# Patient Record
Sex: FEMALE | Race: WHITE
[De-identification: names, ages, dates, MRNs, and addresses within clinical notes are randomized per-mention and may not be internally consistent; named-entity substitution may affect disease eponyms.]

---

## 2019-07-23 ENCOUNTER — HOSPITAL ENCOUNTER (EMERGENCY)
Dept: HOSPITAL 60 - LB.ED | Age: 79
Discharge: HOME | End: 2019-07-23
Payer: MEDICARE

## 2019-07-23 DIAGNOSIS — R04.0: Primary | ICD-10-CM

## 2019-07-23 DIAGNOSIS — Z86.711: ICD-10-CM

## 2019-07-23 NOTE — EDM.PDOC
ED HPI GENERAL MEDICAL PROBLEM





- General


Chief Complaint: General


Stated Complaint: BLOODY NOSE


Time Seen by Provider: 07/23/19 02:50


Source of Information: Reports: Patient, Family, RN


History Limitations: Reports: No Limitations





- History of Present Illness


INITIAL COMMENTS - FREE TEXT/NARRATIVE: 


79 yr female presents to ER with nasal bleeding.  States she is taking a DOAC 

for hx of PE.  She has been using kleenex for packing to nares.  States she hasn

't had any nasal bleeding until now.  She is from [place] and is staying in 

[place] for the summer months.   is with her.  Pt has ate today and is 

taking fluids well.  She is sitting on the side of the stretcher in no acute 

distress.








ED ROS GENERAL





- Review of Systems


Review Of Systems: See Below


Constitutional: Reports: No Symptoms


HEENT: Reports: Nosebleed.  Denies: Sinus Problem, Throat Pain


Respiratory: Reports: No Symptoms


Cardiovascular: Reports: No Symptoms


GI/Abdominal: Reports: No Symptoms


Skin: Reports: No Symptoms


Neurological: Reports: No Symptoms


Psychiatric: Reports: No Symptoms


Hematologic/Lymphatic: Reports: Easy Bleeding





ED EXAM, GENERAL





- Physical Exam


Exam: See Below


Exam Limited By: No Limitations


General Appearance: Alert, No Apparent Distress


Ears: Hearing Grossly Normal


Nose: Other (epistaxis has stopped, no bleeding from nares upon exam.)


Throat/Mouth: Normal Inspection, Normal Lips, Normal Voice, No Airway Compromise


Head: Atraumatic, Normocephalic


Neck: Supple, Non-Tender, Full Range of Motion


Respiratory/Chest: No Respiratory Distress


Cardiovascular: Regular Rate, Rhythm


Extremities: Other (using walker for ambulation)


Neurological: Alert, Oriented, Normal Cognition


Psychiatric: Normal Affect, Normal Mood


Skin Exam: Warm, Dry, Normal Color





Departure





- Departure


Time of Disposition: 03:02


Disposition: Home, Self-Care 01


Condition: Fair


Clinical Impression: 


 Epistaxis








- Discharge Information


*PRESCRIPTION DRUG MONITORING PROGRAM REVIEWED*: Not Applicable


*COPY OF PRESCRIPTION DRUG MONITORING REPORT IN PATIENT [facility]: Not Applicable


Instructions:  Nosebleed, Easy-to-Read


Referrals: 


PCP,None [Primary Care Provider] - 


Forms:  ED Department Discharge





- Assessment/Plan


Plan: 


 Epistaxis has stopped upon exam of pt.  Recommend to have no sneezing, no 

blowing of nose for next 24 hour as able.  Recommend use of saline mist to 

nares or vaseline, thin amount for moisture and no use of fingernails into 

nares.  Follow-up in clinic or ER if uncontrolled epistaxis returns.